# Patient Record
Sex: MALE | Race: WHITE | NOT HISPANIC OR LATINO
[De-identification: names, ages, dates, MRNs, and addresses within clinical notes are randomized per-mention and may not be internally consistent; named-entity substitution may affect disease eponyms.]

---

## 2022-03-30 ENCOUNTER — APPOINTMENT (OUTPATIENT)
Dept: FAMILY MEDICINE | Facility: CLINIC | Age: 37
End: 2022-03-30
Payer: COMMERCIAL

## 2022-03-30 VITALS
BODY MASS INDEX: 25.58 KG/M2 | WEIGHT: 163 LBS | HEART RATE: 69 BPM | OXYGEN SATURATION: 97 % | TEMPERATURE: 98.7 F | SYSTOLIC BLOOD PRESSURE: 110 MMHG | DIASTOLIC BLOOD PRESSURE: 76 MMHG | HEIGHT: 67 IN

## 2022-03-30 DIAGNOSIS — Z23 ENCOUNTER FOR IMMUNIZATION: ICD-10-CM

## 2022-03-30 DIAGNOSIS — Z13.29 ENCOUNTER FOR SCREENING FOR OTHER SUSPECTED ENDOCRINE DISORDER: ICD-10-CM

## 2022-03-30 DIAGNOSIS — Z13.220 ENCOUNTER FOR SCREENING FOR LIPOID DISORDERS: ICD-10-CM

## 2022-03-30 DIAGNOSIS — Z78.9 OTHER SPECIFIED HEALTH STATUS: ICD-10-CM

## 2022-03-30 DIAGNOSIS — Z83.3 FAMILY HISTORY OF DIABETES MELLITUS: ICD-10-CM

## 2022-03-30 DIAGNOSIS — Z13.21 ENCOUNTER FOR SCREENING FOR NUTRITIONAL DISORDER: ICD-10-CM

## 2022-03-30 DIAGNOSIS — Z00.00 ENCOUNTER FOR GENERAL ADULT MEDICAL EXAMINATION W/OUT ABNORMAL FINDINGS: ICD-10-CM

## 2022-03-30 DIAGNOSIS — Z82.49 FAMILY HISTORY OF ISCHEMIC HEART DISEASE AND OTHER DISEASES OF THE CIRCULATORY SYSTEM: ICD-10-CM

## 2022-03-30 DIAGNOSIS — Z13.1 ENCOUNTER FOR SCREENING FOR DIABETES MELLITUS: ICD-10-CM

## 2022-03-30 PROCEDURE — 99385 PREV VISIT NEW AGE 18-39: CPT | Mod: 25

## 2022-03-30 PROCEDURE — G0444 DEPRESSION SCREEN ANNUAL: CPT | Mod: NC,59

## 2022-03-30 PROCEDURE — G0442 ANNUAL ALCOHOL SCREEN 15 MIN: CPT | Mod: NC

## 2022-03-30 PROCEDURE — 93000 ELECTROCARDIOGRAM COMPLETE: CPT | Mod: 59

## 2022-03-30 PROCEDURE — 36415 COLL VENOUS BLD VENIPUNCTURE: CPT

## 2022-03-30 NOTE — HISTORY OF PRESENT ILLNESS
[FreeTextEntry1] : New Patient Establish Care [de-identified] : 35 yo male presents for annual physical. No acute complaints. Reports feeling well. Denies fever, chills, cp, palpitations, sob, nv, heat/cold intolerance, dizziness, melena, hematochezia, muscle weakness, loss of sensation, bowel/bladder incontinence or calf pain.\par

## 2022-03-30 NOTE — ASSESSMENT
[FreeTextEntry1] : Annual physical: f/u routine labwork\par Family hx of early CAD: pt's brother passed from MI at age 38, EKG shows NSR, refer to cardiology for evaluation\par RTC 3 wks

## 2022-03-30 NOTE — HEALTH RISK ASSESSMENT
[Very Good] : ~his/her~  mood as very good [Never] : Never [Yes] : Yes [2 - 4 times a month (2 pts)] : 2-4 times a month (2 points) [1 or 2 (0 pts)] : 1 or 2 (0 points) [Less than monthly (1 pt)] : Less than monthly (1 point) [No] : In the past 12 months have you used drugs other than those required for medical reasons? No [No falls in past year] : Patient reported no falls in the past year [0] : 2) Feeling down, depressed, or hopeless: Not at all (0) [PHQ-2 Negative - No further assessment needed] : PHQ-2 Negative - No further assessment needed [HIV Test offered] : HIV Test offered [Hepatitis C test offered] : Hepatitis C test offered [With Family] : lives with family [Employed] : employed [Significant Other] : lives with significant other [Sexually Active] : sexually active [Feels Safe at Home] : Feels safe at home [Fully functional (bathing, dressing, toileting, transferring, walking, feeding)] : Fully functional (bathing, dressing, toileting, transferring, walking, feeding) [Fully functional (using the telephone, shopping, preparing meals, housekeeping, doing laundry, using] : Fully functional and needs no help or supervision to perform IADLs (using the telephone, shopping, preparing meals, housekeeping, doing laundry, using transportation, managing medications and managing finances) [Audit-CScore] : 3 [de-identified] : regularly [de-identified] : healthy [[id number]] : 0 [High Risk Behavior] : no high risk behavior [Reports changes in hearing] : Reports no changes in hearing [Reports changes in vision] : Reports no changes in vision [Reports changes in dental health] : Reports no changes in dental health

## 2022-04-06 DIAGNOSIS — E55.9 VITAMIN D DEFICIENCY, UNSPECIFIED: ICD-10-CM

## 2022-04-06 LAB
25(OH)D3 SERPL-MCNC: 13.1 NG/ML
ALBUMIN SERPL ELPH-MCNC: 5 G/DL
ALP BLD-CCNC: 100 U/L
ALT SERPL-CCNC: 28 U/L
ANION GAP SERPL CALC-SCNC: 16 MMOL/L
APPEARANCE: CLEAR
AST SERPL-CCNC: 17 U/L
BACTERIA: NEGATIVE
BASOPHILS # BLD AUTO: 0.03 K/UL
BASOPHILS NFR BLD AUTO: 0.3 %
BILIRUB SERPL-MCNC: 2.2 MG/DL
BILIRUBIN URINE: NEGATIVE
BLOOD URINE: NEGATIVE
BUN SERPL-MCNC: 11 MG/DL
CALCIUM SERPL-MCNC: 10.2 MG/DL
CHLORIDE SERPL-SCNC: 102 MMOL/L
CHOLEST SERPL-MCNC: 193 MG/DL
CO2 SERPL-SCNC: 22 MMOL/L
COLOR: NORMAL
CREAT SERPL-MCNC: 0.97 MG/DL
EGFR: 104 ML/MIN/1.73M2
EOSINOPHIL # BLD AUTO: 0.08 K/UL
EOSINOPHIL NFR BLD AUTO: 0.9 %
ESTIMATED AVERAGE GLUCOSE: 114 MG/DL
GLUCOSE QUALITATIVE U: NEGATIVE
GLUCOSE SERPL-MCNC: 82 MG/DL
HBA1C MFR BLD HPLC: 5.6 %
HCT VFR BLD CALC: 46.1 %
HDLC SERPL-MCNC: 55 MG/DL
HGB BLD-MCNC: 15.3 G/DL
HYALINE CASTS: 0 /LPF
IMM GRANULOCYTES NFR BLD AUTO: 0.2 %
KETONES URINE: NEGATIVE
LDLC SERPL CALC-MCNC: 121 MG/DL
LEUKOCYTE ESTERASE URINE: NEGATIVE
LYMPHOCYTES # BLD AUTO: 2.15 K/UL
LYMPHOCYTES NFR BLD AUTO: 24.4 %
MAN DIFF?: NORMAL
MCHC RBC-ENTMCNC: 29.7 PG
MCHC RBC-ENTMCNC: 33.2 GM/DL
MCV RBC AUTO: 89.5 FL
MICROSCOPIC-UA: NORMAL
MONOCYTES # BLD AUTO: 0.56 K/UL
MONOCYTES NFR BLD AUTO: 6.4 %
NEUTROPHILS # BLD AUTO: 5.97 K/UL
NEUTROPHILS NFR BLD AUTO: 67.8 %
NITRITE URINE: NEGATIVE
NONHDLC SERPL-MCNC: 138 MG/DL
PH URINE: 6.5
PLATELET # BLD AUTO: 296 K/UL
POTASSIUM SERPL-SCNC: 4 MMOL/L
PROT SERPL-MCNC: 7.8 G/DL
PROTEIN URINE: NEGATIVE
RBC # BLD: 5.15 M/UL
RBC # FLD: 13 %
RED BLOOD CELLS URINE: 0 /HPF
SODIUM SERPL-SCNC: 140 MMOL/L
SPECIFIC GRAVITY URINE: 1.01
SQUAMOUS EPITHELIAL CELLS: 0 /HPF
TRIGL SERPL-MCNC: 81 MG/DL
TSH SERPL-ACNC: 1.01 UIU/ML
UROBILINOGEN URINE: NORMAL
WBC # FLD AUTO: 8.81 K/UL
WHITE BLOOD CELLS URINE: 0 /HPF

## 2022-05-09 ENCOUNTER — APPOINTMENT (OUTPATIENT)
Dept: FAMILY MEDICINE | Facility: CLINIC | Age: 37
End: 2022-05-09
Payer: COMMERCIAL

## 2022-05-09 VITALS
DIASTOLIC BLOOD PRESSURE: 70 MMHG | SYSTOLIC BLOOD PRESSURE: 110 MMHG | HEART RATE: 70 BPM | HEIGHT: 67 IN | TEMPERATURE: 98.6 F | OXYGEN SATURATION: 98 % | WEIGHT: 164 LBS | BODY MASS INDEX: 25.74 KG/M2

## 2022-05-09 DIAGNOSIS — E78.5 HYPERLIPIDEMIA, UNSPECIFIED: ICD-10-CM

## 2022-05-09 DIAGNOSIS — U07.1 COVID-19: ICD-10-CM

## 2022-05-09 PROCEDURE — 36415 COLL VENOUS BLD VENIPUNCTURE: CPT

## 2022-05-09 PROCEDURE — 99214 OFFICE O/P EST MOD 30 MIN: CPT | Mod: 25

## 2022-05-09 RX ORDER — ERGOCALCIFEROL 1.25 MG/1
1.25 MG CAPSULE, LIQUID FILLED ORAL
Qty: 4 | Refills: 1 | Status: ACTIVE | COMMUNITY
Start: 2022-04-11 | End: 1900-01-01

## 2022-05-09 NOTE — HISTORY OF PRESENT ILLNESS
[FreeTextEntry8] : 38 yo male presents for follow up of covid19 infection. He was diagnosed on 5/3/22. He says he is feeling much better. Symptoms have resolved. Denies fever, chills, cp, palpitations, sob, nv, heat/cold intolerance, dizziness, melena, hematochezia, muscle weakness, loss of sensation, bowel/bladder incontinence or calf pain.\par

## 2022-05-09 NOTE — ASSESSMENT
[FreeTextEntry1] : Covid19 infection: symptoms resolved, will ct monitor, quarantine per cdc guidelines\par Hyperbilirubinemia: ast/alt wnl, currently asymptomatic, f/u repeat labs\par Mild HLD: Recommend low fat diet, wt loss, exercise, nutritional counseling provided, repeat in 3 - 6 months\par RTC 2 wks

## 2022-05-11 DIAGNOSIS — E80.6 OTHER DISORDERS OF BILIRUBIN METABOLISM: ICD-10-CM

## 2022-05-11 LAB
ALBUMIN SERPL ELPH-MCNC: 5 G/DL
ALP BLD-CCNC: 102 U/L
ALT SERPL-CCNC: 44 U/L
AST SERPL-CCNC: 27 U/L
BILIRUB DIRECT SERPL-MCNC: 0.2 MG/DL
BILIRUB INDIRECT SERPL-MCNC: 0.7 MG/DL
BILIRUB SERPL-MCNC: 0.8 MG/DL
GGT SERPL-CCNC: 16 U/L
HAPTOGLOB SERPL-MCNC: 193 MG/DL
INR PPP: 0.97 RATIO
LDH SERPL-CCNC: 171 U/L
PROT SERPL-MCNC: 7.9 G/DL
PT BLD: 11.4 SEC
RBC # BLD: 5.17 M/UL
RETICS # AUTO: 0.6 %
RETICS AGGREG/RBC NFR: 31.5 K/UL

## 2022-05-15 LAB — 5NT SERPL-CCNC: 4 IU/L

## 2023-01-19 ENCOUNTER — EMERGENCY (EMERGENCY)
Facility: HOSPITAL | Age: 38
LOS: 1 days | Discharge: HOME | End: 2023-01-19
Attending: EMERGENCY MEDICINE | Admitting: EMERGENCY MEDICINE
Payer: SELF-PAY

## 2023-01-19 VITALS
HEART RATE: 88 BPM | SYSTOLIC BLOOD PRESSURE: 128 MMHG | OXYGEN SATURATION: 98 % | TEMPERATURE: 97 F | RESPIRATION RATE: 18 BRPM | DIASTOLIC BLOOD PRESSURE: 63 MMHG | WEIGHT: 179.9 LBS
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
## 2023-01-19 DIAGNOSIS — M25.531 PAIN IN RIGHT WRIST: ICD-10-CM
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
## 2023-01-19 PROCEDURE — 73110 X-RAY EXAM OF WRIST: CPT | Mod: 26,RT

## 2023-01-19 PROCEDURE — 99284 EMERGENCY DEPT VISIT MOD MDM: CPT

## 2023-01-19 RX ORDER — ACETAMINOPHEN 500 MG
975 TABLET ORAL ONCE
Refills: 0 | Status: COMPLETED | OUTPATIENT
Start: 2023-01-19 | End: 2023-01-19

## 2023-01-19 RX ORDER — IBUPROFEN 200 MG
600 TABLET ORAL ONCE
Refills: 0 | Status: COMPLETED | OUTPATIENT
Start: 2023-01-19 | End: 2023-01-19

## 2023-01-19 RX ADMIN — Medication 600 MILLIGRAM(S): at 17:14

## 2023-01-19 RX ADMIN — Medication 975 MILLIGRAM(S): at 17:14

## 2023-01-19 NOTE — ED PROVIDER NOTE - CARE PROVIDERS DIRECT ADDRESSES
[email or web address]

## 2023-01-19 NOTE — ED PROVIDER NOTE - PHYSICAL EXAMINATION
Arely Mckinley MD  GENERAL: Patient awake alert NAD.  HEENT: NC/AT, Moist mucous membranes, EOMI.  LUNGS: normal work of breathing  EXT: R wrist no lacs, bruising, edema, erythema, deformity. Mildly tender at ulnar side and dorsal aspect of wrist. able to a okay, cross fingers, thumbs up. full strength in hand and ROM in wrist  MSK: No pain with movement, no deformities.  NEURO: A&Ox3. Moving all extremities.  SKIN: Warm and dry. No rash.  PSYCH: Normal affect.
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
## 2023-01-19 NOTE — ED PROVIDER NOTE - NSFOLLOWUPINSTRUCTIONS_ED_ALL_ED_FT
You were seen in the ED for wrist pain.  Your x-rays did not show any fracture or dislocation.     Please follow-up with your primary care doctor and orthopedic doctor in the next 2 to 3 days.  If you continue to have severe pain in your wrist, you may need repeat x-rays.    For pain, please take 650mg Tylenol every 6 hours as needed or 600mg ibuprofen every 8 hours as needed. Always take ibuprofen with food. You make take both Tylenol and ibuprofen as described above if one medication is not enough for your pain.    Return to the ED if you experience any worsening or new symptoms or any symptoms that concern you, including fevers, chills, shortness of breath, chest pain

## 2023-01-19 NOTE — ED PROVIDER NOTE - PATIENT PORTAL LINK FT
You can access the FollowMyHealth Patient Portal offered by [facility] by registering at the following website: [email or web address]. By joining [email or web address] FollowMyHealth portal, you will also be able to view your health information using other applications (apps) compatible with our system.

## 2023-01-19 NOTE — ED PROVIDER NOTE - CARE PROVIDER_API CALL
Kevin Flores)  Orthopaedic Surgery Surgery  [address] [address]  Phone: [phone]  Fax: [phone]  Follow Up Time:     Nancy Bo)  Plastic Surgery; Surgery  [address] [place] [id number]  Phone: [phone]  Fax: [phone]  Follow Up Time:

## 2023-01-24 ENCOUNTER — APPOINTMENT (OUTPATIENT)
Dept: ORTHOPEDIC SURGERY | Facility: CLINIC | Age: 38
End: 2023-01-24
Payer: OTHER MISCELLANEOUS

## 2023-01-24 VITALS — BODY MASS INDEX: 25.11 KG/M2 | HEIGHT: 67 IN | WEIGHT: 160 LBS

## 2023-01-24 DIAGNOSIS — S69.81XA OTHER SPECIFIED INJURIES OF RIGHT WRIST, HAND AND FINGER(S), INITIAL ENCOUNTER: ICD-10-CM

## 2023-01-24 DIAGNOSIS — Z78.9 OTHER SPECIFIED HEALTH STATUS: ICD-10-CM

## 2023-01-24 PROCEDURE — 73110 X-RAY EXAM OF WRIST: CPT | Mod: RT

## 2023-01-24 PROCEDURE — 99072 ADDL SUPL MATRL&STAF TM PHE: CPT

## 2023-01-24 PROCEDURE — 99204 OFFICE O/P NEW MOD 45 MIN: CPT
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
## 2023-01-24 NOTE — IMAGING
[de-identified] : RIGHT WRIST EXAM\par +++ttp at fovea, ECU, no snapping.\par Skin intact\par No deformity, edema, ecchymosis\par Warm and well perfused\par Brisk capillary refill throughout\par Motor function intact AIN, PIN, and ulnar nerves\par Sensation intact to light touch in median, ulnar, and radial nerves\par Patient is able to make a composite fist\par \par Right wrist radiographs with no fracture nor dislocation. No arthrosis.

## 2023-01-24 NOTE — ASSESSMENT
[FreeTextEntry1] : Right TFCC tear with ECU tendonitis - reviewed radiograph and pathoanatomy with patient. Discussed management to consist of NSAIDs prn, bracing prn (wrist widget), OT.\par \par Patient will be full disability; guarded prognosis.\par \par F/u 4 weeks

## 2023-02-01 ENCOUNTER — RESULT REVIEW (OUTPATIENT)
Age: 38
End: 2023-02-01

## 2023-02-28 ENCOUNTER — APPOINTMENT (OUTPATIENT)
Dept: ORTHOPEDIC SURGERY | Facility: CLINIC | Age: 38
End: 2023-02-28
Payer: OTHER MISCELLANEOUS

## 2023-02-28 ENCOUNTER — NON-APPOINTMENT (OUTPATIENT)
Age: 38
End: 2023-02-28

## 2023-02-28 VITALS — BODY MASS INDEX: 25.11 KG/M2 | HEIGHT: 67 IN | WEIGHT: 160 LBS

## 2023-02-28 PROCEDURE — 99072 ADDL SUPL MATRL&STAF TM PHE: CPT

## 2023-02-28 PROCEDURE — 99214 OFFICE O/P EST MOD 30 MIN: CPT

## 2023-02-28 NOTE — REASON FOR VISIT
[FreeTextEntry2] : MRI RT WRIST IMPRESSION:\par IMPRESSION:\par \par 1. Partial tear of the peripheral portion of the TFCC involving the foveal\par attachment with associated 5 mm ganglion cyst.\par 2. 4 x 2 mm ganglion cyst in the volar aspect of distal radius.

## 2023-02-28 NOTE — IMAGING
[de-identified] : RIGHT WRIST EXAM\par +++ttp at fovea, ECU, no snapping.\par Skin intact\par No deformity, edema, ecchymosis\par Warm and well perfused\par Brisk capillary refill throughout\par Motor function intact AIN, PIN, and ulnar nerves\par Sensation intact to light touch in median, ulnar, and radial nerves\par Patient is able to make a composite fist\par \par Right wrist radiographs with no fracture nor dislocation. No arthrosis.\par Right wrist MRI with partial peripheral TFCC tear and longitudinal ECU tear.
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
## 2023-02-28 NOTE — ASSESSMENT
[FreeTextEntry1] : Right TFCC tear with ECU tendonitis - reviewed MRI, radiograph and pathoanatomy with patient. Discussed management to consist of NSAIDs prn, bracing prn (wrist widget), OT.\par \par Patient will be full disability; guarded prognosis.\par \par F/u 4 weeks

## 2023-03-13 ENCOUNTER — FORM ENCOUNTER (OUTPATIENT)
Age: 38
End: 2023-03-13

## 2023-04-26 ENCOUNTER — APPOINTMENT (OUTPATIENT)
Dept: ORTHOPEDIC SURGERY | Facility: CLINIC | Age: 38
End: 2023-04-26
Payer: OTHER MISCELLANEOUS

## 2023-04-26 DIAGNOSIS — S69.90XA UNSPECIFIED INJURY OF UNSPECIFIED WRIST, HAND AND FINGER(S), INITIAL ENCOUNTER: ICD-10-CM

## 2023-04-26 PROCEDURE — 99214 OFFICE O/P EST MOD 30 MIN: CPT

## 2023-04-26 NOTE — ASSESSMENT
[FreeTextEntry1] : Right TFCC tear with ECU tendonitis - reviewed MRI, radiograph and pathoanatomy with patient. Discussed management to consist of NSAIDs prn, bracing prn (wrist widget), OT. Continue OT as he's made large improvement with OT.\par \par Patient will be full duty; guarded prognosis.\par \par F/u 8 weeks/prn

## 2023-04-26 NOTE — IMAGING
[de-identified] : RIGHT WRIST EXAM\par Mild ttp at fovea, ECU, no snapping.\par Skin intact\par No deformity, edema, ecchymosis\par Warm and well perfused\par Brisk capillary refill throughout\par Motor function intact AIN, PIN, and ulnar nerves\par Sensation intact to light touch in median, ulnar, and radial nerves\par Patient is able to make a composite fist\par \par Right wrist radiographs with no fracture nor dislocation. No arthrosis.\par Right wrist MRI with partial peripheral TFCC tear and longitudinal ECU tear.
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
